# Patient Record
Sex: MALE | Race: WHITE | NOT HISPANIC OR LATINO
[De-identification: names, ages, dates, MRNs, and addresses within clinical notes are randomized per-mention and may not be internally consistent; named-entity substitution may affect disease eponyms.]

---

## 2017-07-11 ENCOUNTER — APPOINTMENT (OUTPATIENT)
Dept: SURGERY | Facility: CLINIC | Age: 67
End: 2017-07-11

## 2017-07-11 VITALS
HEART RATE: 75 BPM | DIASTOLIC BLOOD PRESSURE: 83 MMHG | HEIGHT: 69 IN | SYSTOLIC BLOOD PRESSURE: 129 MMHG | BODY MASS INDEX: 26.66 KG/M2 | WEIGHT: 180 LBS

## 2017-07-11 DIAGNOSIS — Z86.39 PERSONAL HISTORY OF OTHER ENDOCRINE, NUTRITIONAL AND METABOLIC DISEASE: ICD-10-CM

## 2017-07-11 RX ORDER — VALSARTAN 80 MG/1
80 TABLET, COATED ORAL
Qty: 90 | Refills: 0 | Status: ACTIVE | COMMUNITY
Start: 2016-09-12

## 2017-11-15 ENCOUNTER — LABORATORY RESULT (OUTPATIENT)
Age: 67
End: 2017-11-15

## 2017-11-16 ENCOUNTER — APPOINTMENT (OUTPATIENT)
Dept: SURGERY | Facility: CLINIC | Age: 67
End: 2017-11-16
Payer: MEDICARE

## 2017-11-16 VITALS — HEART RATE: 67 BPM | SYSTOLIC BLOOD PRESSURE: 153 MMHG | DIASTOLIC BLOOD PRESSURE: 88 MMHG

## 2017-11-16 PROCEDURE — 12041 INTMD RPR N-HF/GENIT 2.5CM/<: CPT

## 2017-11-16 PROCEDURE — 11423 EXC H-F-NK-SP B9+MARG 2.1-3: CPT

## 2017-11-30 ENCOUNTER — APPOINTMENT (OUTPATIENT)
Dept: SURGERY | Facility: CLINIC | Age: 67
End: 2017-11-30
Payer: MEDICARE

## 2017-11-30 DIAGNOSIS — L72.3 SEBACEOUS CYST: ICD-10-CM

## 2017-11-30 PROCEDURE — 99024 POSTOP FOLLOW-UP VISIT: CPT

## 2018-02-26 ENCOUNTER — EMERGENCY (EMERGENCY)
Facility: HOSPITAL | Age: 68
LOS: 0 days | Discharge: HOME | End: 2018-02-26
Attending: EMERGENCY MEDICINE | Admitting: EMERGENCY MEDICINE
Payer: COMMERCIAL

## 2018-02-26 VITALS
OXYGEN SATURATION: 99 % | HEART RATE: 73 BPM | SYSTOLIC BLOOD PRESSURE: 136 MMHG | TEMPERATURE: 97 F | DIASTOLIC BLOOD PRESSURE: 82 MMHG | RESPIRATION RATE: 16 BRPM

## 2018-02-26 VITALS
WEIGHT: 160.06 LBS | SYSTOLIC BLOOD PRESSURE: 150 MMHG | HEIGHT: 69 IN | HEART RATE: 68 BPM | RESPIRATION RATE: 17 BRPM | OXYGEN SATURATION: 100 % | DIASTOLIC BLOOD PRESSURE: 98 MMHG | TEMPERATURE: 97 F

## 2018-02-26 DIAGNOSIS — M25.512 PAIN IN LEFT SHOULDER: ICD-10-CM

## 2018-02-26 DIAGNOSIS — M79.1 MYALGIA: ICD-10-CM

## 2018-02-26 LAB
ALBUMIN SERPL ELPH-MCNC: 3.8 G/DL — SIGNIFICANT CHANGE UP (ref 3.3–5)
ALP SERPL-CCNC: 82 U/L — SIGNIFICANT CHANGE UP (ref 40–120)
ALT FLD-CCNC: 26 U/L — SIGNIFICANT CHANGE UP (ref 12–78)
ANION GAP SERPL CALC-SCNC: 6 MMOL/L — SIGNIFICANT CHANGE UP (ref 5–17)
AST SERPL-CCNC: 19 U/L — SIGNIFICANT CHANGE UP (ref 15–37)
BASOPHILS # BLD AUTO: 0.1 K/UL — SIGNIFICANT CHANGE UP (ref 0–0.2)
BASOPHILS NFR BLD AUTO: 1.2 % — SIGNIFICANT CHANGE UP (ref 0–2)
BILIRUB SERPL-MCNC: 0.3 MG/DL — SIGNIFICANT CHANGE UP (ref 0.2–1.2)
BUN SERPL-MCNC: 22 MG/DL — SIGNIFICANT CHANGE UP (ref 7–23)
CALCIUM SERPL-MCNC: 9.4 MG/DL — SIGNIFICANT CHANGE UP (ref 8.5–10.1)
CHLORIDE SERPL-SCNC: 106 MMOL/L — SIGNIFICANT CHANGE UP (ref 96–108)
CO2 SERPL-SCNC: 28 MMOL/L — SIGNIFICANT CHANGE UP (ref 22–31)
CREAT SERPL-MCNC: 1.16 MG/DL — SIGNIFICANT CHANGE UP (ref 0.5–1.3)
EOSINOPHIL # BLD AUTO: 0.6 K/UL — HIGH (ref 0–0.5)
EOSINOPHIL NFR BLD AUTO: 9.6 % — HIGH (ref 0–6)
GLUCOSE SERPL-MCNC: 99 MG/DL — SIGNIFICANT CHANGE UP (ref 70–99)
HCT VFR BLD CALC: 46.6 % — SIGNIFICANT CHANGE UP (ref 39–50)
HGB BLD-MCNC: 15.7 G/DL — SIGNIFICANT CHANGE UP (ref 13–17)
LYMPHOCYTES # BLD AUTO: 1.4 K/UL — SIGNIFICANT CHANGE UP (ref 1–3.3)
LYMPHOCYTES # BLD AUTO: 21.4 % — SIGNIFICANT CHANGE UP (ref 13–44)
MCHC RBC-ENTMCNC: 29.9 PG — SIGNIFICANT CHANGE UP (ref 27–34)
MCHC RBC-ENTMCNC: 33.8 GM/DL — SIGNIFICANT CHANGE UP (ref 32–36)
MCV RBC AUTO: 88.6 FL — SIGNIFICANT CHANGE UP (ref 80–100)
MONOCYTES # BLD AUTO: 0.3 K/UL — SIGNIFICANT CHANGE UP (ref 0–0.9)
MONOCYTES NFR BLD AUTO: 4.7 % — SIGNIFICANT CHANGE UP (ref 2–14)
NEUTROPHILS # BLD AUTO: 4.2 K/UL — SIGNIFICANT CHANGE UP (ref 1.8–7.4)
NEUTROPHILS NFR BLD AUTO: 63.1 % — SIGNIFICANT CHANGE UP (ref 43–77)
PLATELET # BLD AUTO: 221 K/UL — SIGNIFICANT CHANGE UP (ref 150–400)
POTASSIUM SERPL-MCNC: 4.4 MMOL/L — SIGNIFICANT CHANGE UP (ref 3.5–5.3)
POTASSIUM SERPL-SCNC: 4.4 MMOL/L — SIGNIFICANT CHANGE UP (ref 3.5–5.3)
PROT SERPL-MCNC: 7.7 GM/DL — SIGNIFICANT CHANGE UP (ref 6–8.3)
RBC # BLD: 5.26 M/UL — SIGNIFICANT CHANGE UP (ref 4.2–5.8)
RBC # FLD: 12.7 % — SIGNIFICANT CHANGE UP (ref 10.3–14.5)
SODIUM SERPL-SCNC: 140 MMOL/L — SIGNIFICANT CHANGE UP (ref 135–145)
TROPONIN I SERPL-MCNC: <0.015 NG/ML — SIGNIFICANT CHANGE UP (ref 0.01–0.04)
WBC # BLD: 6.6 K/UL — SIGNIFICANT CHANGE UP (ref 3.8–10.5)
WBC # FLD AUTO: 6.6 K/UL — SIGNIFICANT CHANGE UP (ref 3.8–10.5)

## 2018-02-26 PROCEDURE — 73030 X-RAY EXAM OF SHOULDER: CPT | Mod: 26,LT

## 2018-02-26 PROCEDURE — 99053 MED SERV 10PM-8AM 24 HR FAC: CPT

## 2018-02-26 PROCEDURE — 71250 CT THORAX DX C-: CPT | Mod: 26

## 2018-02-26 PROCEDURE — 99285 EMERGENCY DEPT VISIT HI MDM: CPT

## 2018-02-26 PROCEDURE — 93010 ELECTROCARDIOGRAM REPORT: CPT

## 2018-02-26 RX ORDER — SODIUM CHLORIDE 9 MG/ML
3 INJECTION INTRAMUSCULAR; INTRAVENOUS; SUBCUTANEOUS ONCE
Qty: 0 | Refills: 0 | Status: COMPLETED | OUTPATIENT
Start: 2018-02-26 | End: 2018-02-26

## 2018-02-26 RX ADMIN — SODIUM CHLORIDE 3 MILLILITER(S): 9 INJECTION INTRAMUSCULAR; INTRAVENOUS; SUBCUTANEOUS at 08:45
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
## 2018-02-27 RX ORDER — LOVASTATIN 20 MG
0 TABLET ORAL
Qty: 0 | Refills: 0 | COMMUNITY

## 2018-02-27 RX ORDER — VALSARTAN 80 MG/1
0 TABLET ORAL
Qty: 0 | Refills: 0 | COMMUNITY

## 2020-07-21 ENCOUNTER — EMERGENCY (EMERGENCY)
Facility: HOSPITAL | Age: 70
LOS: 0 days | Discharge: HOME | End: 2020-07-21
Payer: MEDICARE

## 2020-07-21 VITALS
DIASTOLIC BLOOD PRESSURE: 61 MMHG | HEART RATE: 74 BPM | OXYGEN SATURATION: 98 % | SYSTOLIC BLOOD PRESSURE: 122 MMHG | TEMPERATURE: 98 F

## 2020-07-21 DIAGNOSIS — Z20.828 CONTACT WITH AND (SUSPECTED) EXPOSURE TO OTHER VIRAL COMMUNICABLE DISEASES: ICD-10-CM

## 2020-07-21 PROBLEM — E78.00 PURE HYPERCHOLESTEROLEMIA, UNSPECIFIED: Chronic | Status: ACTIVE | Noted: 2018-02-26

## 2020-07-21 PROCEDURE — U0003: CPT

## 2020-07-21 PROCEDURE — 99282 EMERGENCY DEPT VISIT SF MDM: CPT

## 2020-07-21 PROCEDURE — 99283 EMERGENCY DEPT VISIT LOW MDM: CPT | Mod: CS

## 2020-07-21 PROCEDURE — 99283 EMERGENCY DEPT VISIT LOW MDM: CPT

## 2020-07-21 NOTE — ED STATDOCS - PATIENT PORTAL LINK FT
You can access the FollowMyHealth Patient Portal offered by [facility] by registering at the following website: [email or web address]. By joining [employer]’s FollowMyHealth portal, you will also be able to view your health information using other applications (apps) compatible with our system.

## 2020-07-21 NOTE — ED ADULT NURSE NOTE - OBJECTIVE STATEMENT
Patient comes to ED for COVID 19 test, pt denies any symptoms. requesting swab.  Patient evaluated, treated and D/C by PA. Refer to PA note for assessment. D/C instructions provided  Patient provides verbal consent to receive results via text or email.

## 2020-07-21 NOTE — ED STATDOCS - OBJECTIVE STATEMENT
Pt presents to ED with no fever, no cough, no  runny nose, no body aches, no sore throat.. Pt recently exposed to COVID-19. Pt here for testing.

## 2020-07-22 LAB — SARS-COV-2 RNA SPEC QL NAA+PROBE: SIGNIFICANT CHANGE UP

## 2020-08-07 ENCOUNTER — EMERGENCY (EMERGENCY)
Facility: HOSPITAL | Age: 70
LOS: 0 days | Discharge: HOME | End: 2020-08-07
Payer: MEDICARE

## 2020-08-07 VITALS
TEMPERATURE: 99 F | HEART RATE: 69 BPM | SYSTOLIC BLOOD PRESSURE: 120 MMHG | OXYGEN SATURATION: 98 % | RESPIRATION RATE: 17 BRPM | DIASTOLIC BLOOD PRESSURE: 70 MMHG

## 2020-08-07 DIAGNOSIS — Z11.59 ENCOUNTER FOR SCREENING FOR OTHER VIRAL DISEASES: ICD-10-CM

## 2020-08-07 DIAGNOSIS — Z03.818 ENCOUNTER FOR OBSERVATION FOR SUSPECTED EXPOSURE TO OTHER BIOLOGICAL AGENTS RULED OUT: ICD-10-CM

## 2020-08-07 PROCEDURE — 99282 EMERGENCY DEPT VISIT SF MDM: CPT

## 2020-08-07 PROCEDURE — 99283 EMERGENCY DEPT VISIT LOW MDM: CPT

## 2020-08-07 PROCEDURE — U0003: CPT

## 2020-08-07 PROCEDURE — 99283 EMERGENCY DEPT VISIT LOW MDM: CPT | Mod: CS

## 2020-08-07 NOTE — ED STATDOCS - PATIENT PORTAL LINK FT
You can access the FollowMyHealth Patient Portal offered by [facility] by registering at the following website: [email or web address]. By joining [email or web address]’s FollowMyHealth portal, you will also be able to view your health information using other applications (apps) compatible with our system.

## 2020-08-08 LAB — SARS-COV-2 RNA SPEC QL NAA+PROBE: SIGNIFICANT CHANGE UP

## 2022-01-18 NOTE — ED ADULT TRIAGE NOTE - NS ED NURSE BANDS TYPE
Date of Injury:  12/21/2021     First Date Seen:  12/21/21    Subjective:  Patient presents with 7 on a 10 pain scale today.  Patient reports a level of low back pain similar to previous visit.  He notes that his ability to rotate his low back moved with less pain now.  He still has pain when he lifts things off the ground or squat.  Continues to have persistent right knee pain and he is referred to an orthopedic provider for assessment.    Objective: Taut and tender fibers are noted in the right knee, T4, T5, T6, T7, T12, L4, L5, sacroiliac left and sacroiliac right regions.  Bilateral knee flexion test is negative.  Hip internal and external rotation are within normal limits and negative  for increasing pain.    Opinion:Diagnosis of right knee pain, thoracic sprain , lumbar sprain and sacroiliac sprain. Patient progressing as expected.    Options: Patient's options other than care in this office include home based advice to remain active and against bed rest. Patient is advised to perform Ewa extension exercises home based exercise.    Advice: Patient is advised to continue with home program.    Agreed Plan: Patient will follow up on one week.    Procedures:  The patient understands and agrees to the following procedures:  Chiropractic manipulative treatment is performed at right knee, T4, T5, T6, T7, T12, L4, L5, sacroiliac left and sacroiliac right.  Vibratory massage performed in the trapezius, lumbar region for a period of 5 minutes.  Patient performs Ewa extension exercises home based exercise. Total instruction time 8 minutes.   Post-manipulation audit reveals an increase in range of motion and decrease in pain.  End of visit pain rated 2.       Name band;

## 2023-02-02 ENCOUNTER — APPOINTMENT (OUTPATIENT)
Dept: ORTHOPEDIC SURGERY | Facility: CLINIC | Age: 73
End: 2023-02-02
Payer: MEDICARE

## 2023-02-02 VITALS — HEIGHT: 69 IN | BODY MASS INDEX: 27.4 KG/M2 | WEIGHT: 185 LBS

## 2023-02-02 PROCEDURE — 73562 X-RAY EXAM OF KNEE 3: CPT | Mod: LT

## 2023-02-02 PROCEDURE — 20610 DRAIN/INJ JOINT/BURSA W/O US: CPT | Mod: LT

## 2023-02-02 PROCEDURE — 99214 OFFICE O/P EST MOD 30 MIN: CPT | Mod: 25

## 2023-02-02 NOTE — HISTORY OF PRESENT ILLNESS
[8] : 8 [0] : 0 [Sharp] : sharp [Stabbing] : stabbing [Intermittent] : intermittent [Exercising] : exercising [Lying in bed] : lying in bed [Part time] : Work status: part time [de-identified] : 02/02/23:  Return visit for a 72 year old male here for complaint of recurrent left knee pain and known OA that began over 1 year ago. Taking no nsaids. Worse doing Nat classes or running, or turning and changing directions. Described as sharp fleeting pain. Can walk 7 miles w/o pain.\par \par PMH:  Status post arthroscopic right shoulder surgery two months ago.\par \par IMPRESSION: RIGHT KNEE 04/23/19\par 1. Popliteus tendinosis.\par 2. Radial tears of medial and lateral menisci; further detailed above.\par 3. Arthropathy preferential to medial compartment, in particular outer one-third medial tibial plateau\par with associated subchondral remodeling.\par 4. Joint effusion with joint fluid in popliteus tendon sheath and synovitis in popliteus tendon sheath.\par ___________________________________\par 04/18/19: Initial visit for a 68 year old male who states he has been having difficulty exercising for the last year because of developing bilateral knee pain, right worse than left. He has now cut down some on his routine but symptoms are persisting and worsening in the past six days. Used topical cream for pain and Advil once or twice a week for pain. Used kinseology tape which has given him about 90% relief. No prior knee pain or surgery [] : no [FreeTextEntry1] : left knee  [de-identified] : none [FreeTextEntry9] : no treatments [de-identified] : elijah

## 2023-02-02 NOTE — DISCUSSION/SUMMARY
[de-identified] : "Written by Alyce Mixon, acting as Scribe for Ike Mar MD."\par \par Dr. Mar - \par The documentation recorded by the scribe accurately reflects the service I personally performed and the decisions made by me.

## 2023-02-02 NOTE — PROCEDURE
[Large Joint Injection] : Large joint injection [Left] : of the left [Knee] : knee [Pain] : pain [X-ray evidence of Osteoarthritis on this or prior visit] : x-ray evidence of Osteoarthritis on this or prior visit [Betadine] : betadine [Ethyl Chloride sprayed topically] : ethyl chloride sprayed topically [Euflexxa(20mg)] : 20mg of Euflexxa [#1] : series #1 [] : Patient tolerated procedure well [Call if redness, pain or fever occur] : call if redness, pain or fever occur [Apply ice for 15min out of every hour for the next 12-24 hours as tolerated] : apply ice for 15 minutes out of every hour for the next 12-24 hours as tolerated [Risks, benefits, alternatives discussed / Verbal consent obtained] : the risks benefits, and alternatives have been discussed, and verbal consent was obtained

## 2023-02-02 NOTE — PHYSICAL EXAM
[Normal Mood and Affect] : normal mood and affect [Able to Communicate] : able to communicate [Well Developed] : well developed [Well Nourished] : well nourished [Right] : right knee [NL (0)] : extension 0 degrees [5___] : hamstring 5[unfilled]/5 [NL (140)] : flexion 140 degrees [Left] : left knee [AP] : anteroposterior [Lateral] : lateral [[place]] : skyline [Degenerative change] : Degenerative change [] : non-antalgic [FreeTextEntry9] : Moderate-severe narrowing medial.  Tiny spurs patella. [TWNoteComboBox7] : flexion 135 degrees

## 2023-02-09 ENCOUNTER — APPOINTMENT (OUTPATIENT)
Dept: ORTHOPEDIC SURGERY | Facility: CLINIC | Age: 73
End: 2023-02-09
Payer: MEDICARE

## 2023-02-09 PROCEDURE — 20610 DRAIN/INJ JOINT/BURSA W/O US: CPT | Mod: LT

## 2023-02-09 PROCEDURE — 99213 OFFICE O/P EST LOW 20 MIN: CPT | Mod: 25

## 2023-02-09 NOTE — DISCUSSION/SUMMARY
[de-identified] : "Written by Alyce Mixon, acting as Scribe for Ike Mar MD."\par \par Dr. Mar - \par The documentation recorded by the scribe accurately reflects the service I personally performed and the decisions made by me.

## 2023-02-09 NOTE — PROCEDURE
[Large Joint Injection] : Large joint injection [Left] : of the left [Knee] : knee [Pain] : pain [X-ray evidence of Osteoarthritis on this or prior visit] : x-ray evidence of Osteoarthritis on this or prior visit [Betadine] : betadine [Ethyl Chloride sprayed topically] : ethyl chloride sprayed topically [Euflexxa(20mg)] : 20mg of Euflexxa [#2] : series #2 [] : Patient tolerated procedure well [Call if redness, pain or fever occur] : call if redness, pain or fever occur [Apply ice for 15min out of every hour for the next 12-24 hours as tolerated] : apply ice for 15 minutes out of every hour for the next 12-24 hours as tolerated [Risks, benefits, alternatives discussed / Verbal consent obtained] : the risks benefits, and alternatives have been discussed, and verbal consent was obtained

## 2023-02-09 NOTE — HISTORY OF PRESENT ILLNESS
[de-identified] : 02/09/23:  Here today for Euflexxa injection #2, left knee.\par \par 02/02/23:  Return visit for a 72 year old male here for complaint of recurrent left knee pain and known OA that began over 1 year ago. Taking no nsaids. Worse doing Nat classes or running, or turning and changing directions. Described as sharp fleeting pain. Can walk 7 miles w/o pain.\par \par PMH:  Status post arthroscopic right shoulder surgery two months ago.\par \par IMPRESSION: RIGHT KNEE 04/23/19\par 1. Popliteus tendinosis.\par 2. Radial tears of medial and lateral menisci; further detailed above.\par 3. Arthropathy preferential to medial compartment, in particular outer one-third medial tibial plateau\par with associated subchondral remodeling.\par 4. Joint effusion with joint fluid in popliteus tendon sheath and synovitis in popliteus tendon sheath.\par ___________________________________\par 04/18/19: Initial visit for a 68 year old male who states he has been having difficulty exercising for the last year because of developing bilateral knee pain, right worse than left. He has now cut down some on his routine but symptoms are persisting and worsening in the past six days. Used topical cream for pain and Advil once or twice a week for pain. Used kinseology tape which has given him about 90% relief. No prior knee pain or surgery

## 2023-02-09 NOTE — PHYSICAL EXAM
[Normal Mood and Affect] : normal mood and affect [Able to Communicate] : able to communicate [Well Developed] : well developed [Well Nourished] : well nourished [Right] : right knee [NL (140)] : flexion 140 degrees [NL (0)] : extension 0 degrees [5___] : hamstring 5[unfilled]/5 [Left] : left knee [AP] : anteroposterior [Lateral] : lateral [[place]] : skyline [Degenerative change] : Degenerative change [] : non-antalgic [FreeTextEntry9] : Moderate-severe narrowing medial.  Tiny spurs patella. [TWNoteComboBox7] : flexion 135 degrees

## 2023-02-16 ENCOUNTER — APPOINTMENT (OUTPATIENT)
Dept: ORTHOPEDIC SURGERY | Facility: CLINIC | Age: 73
End: 2023-02-16
Payer: MEDICARE

## 2023-02-16 VITALS — WEIGHT: 185 LBS | BODY MASS INDEX: 27.4 KG/M2 | HEIGHT: 69 IN

## 2023-02-16 PROCEDURE — 99213 OFFICE O/P EST LOW 20 MIN: CPT | Mod: 25

## 2023-02-16 PROCEDURE — 20610 DRAIN/INJ JOINT/BURSA W/O US: CPT

## 2023-02-16 NOTE — HISTORY OF PRESENT ILLNESS
[1] : 2 [0] : 0 [3] : 3 [Euflexxa] : Euflexxa [Stabbing] : stabbing [Intermittent] : intermittent [Nothing helps with pain getting better] : Nothing helps with pain getting better [Walking] : walking [de-identified] : 02/16/23:  Returns today for Eulfexxa injection #3, left knee. \par \par 02/09/23:  Here today for Euflexxa injection #2, left knee.\par \par 02/02/23:  Return visit for a 72 year old male here for complaint of recurrent left knee pain and known OA that began over 1 year ago. Taking no nsaids. Worse doing Nat classes or running, or turning and changing directions. Described as sharp fleeting pain. Can walk 7 miles w/o pain.\par \par PMH:  Status post arthroscopic right shoulder surgery two months ago.\par \par IMPRESSION: RIGHT KNEE 04/23/19\par 1. Popliteus tendinosis.\par 2. Radial tears of medial and lateral menisci; further detailed above.\par 3. Arthropathy preferential to medial compartment, in particular outer one-third medial tibial plateau\par with associated subchondral remodeling.\par 4. Joint effusion with joint fluid in popliteus tendon sheath and synovitis in popliteus tendon sheath.\par ___________________________________\par 04/18/19: Initial visit for a 68 year old male who states he has been having difficulty exercising for the last year because of developing bilateral knee pain, right worse than left. He has now cut down some on his routine but symptoms are persisting and worsening in the past six days. Used topical cream for pain and Advil once or twice a week for pain. Used kinseology tape which has given him about 90% relief. No prior knee pain or surgery [] : no [FreeTextEntry1] : left knee [de-identified] : certain movements [de-identified] : 2/923 [de-identified] : left knee [de-identified] : Euflexxa

## 2023-02-16 NOTE — PHYSICAL EXAM
[Normal Mood and Affect] : normal mood and affect [Able to Communicate] : able to communicate [Well Developed] : well developed [Well Nourished] : well nourished [Right] : right knee [NL (140)] : flexion 140 degrees [NL (0)] : extension 0 degrees [5___] : hamstring 5[unfilled]/5 [Left] : left knee [AP] : anteroposterior [Lateral] : lateral [[place]] : skyline [Degenerative change] : Degenerative change [] : non-antalgic [FreeTextEntry9] : Moderate-severe narrowing medial.  Tiny spurs patella. [TWNoteComboBox7] : flexion 135 degrees

## 2023-02-16 NOTE — PROCEDURE
[Large Joint Injection] : Large joint injection [Left] : of the left [Knee] : knee [Pain] : pain [X-ray evidence of Osteoarthritis on this or prior visit] : x-ray evidence of Osteoarthritis on this or prior visit [Betadine] : betadine [Ethyl Chloride sprayed topically] : ethyl chloride sprayed topically [Euflexxa(20mg)] : 20mg of Euflexxa [#3] : series #3 [] : Patient tolerated procedure well [Call if redness, pain or fever occur] : call if redness, pain or fever occur [Apply ice for 15min out of every hour for the next 12-24 hours as tolerated] : apply ice for 15 minutes out of every hour for the next 12-24 hours as tolerated [Risks, benefits, alternatives discussed / Verbal consent obtained] : the risks benefits, and alternatives have been discussed, and verbal consent was obtained

## 2023-09-12 ENCOUNTER — RESULT CHARGE (OUTPATIENT)
Age: 73
End: 2023-09-12

## 2023-09-13 ENCOUNTER — APPOINTMENT (OUTPATIENT)
Dept: CARDIOLOGY | Facility: CLINIC | Age: 73
End: 2023-09-13
Payer: MEDICARE

## 2023-09-13 VITALS
HEIGHT: 69 IN | HEART RATE: 69 BPM | SYSTOLIC BLOOD PRESSURE: 132 MMHG | DIASTOLIC BLOOD PRESSURE: 80 MMHG | OXYGEN SATURATION: 96 % | WEIGHT: 188 LBS | BODY MASS INDEX: 27.85 KG/M2

## 2023-09-13 VITALS — DIASTOLIC BLOOD PRESSURE: 78 MMHG | SYSTOLIC BLOOD PRESSURE: 128 MMHG

## 2023-09-13 DIAGNOSIS — Z87.898 PERSONAL HISTORY OF OTHER SPECIFIED CONDITIONS: ICD-10-CM

## 2023-09-13 DIAGNOSIS — N28.89 OTHER SPECIFIED DISORDERS OF KIDNEY AND URETER: ICD-10-CM

## 2023-09-13 DIAGNOSIS — Z82.49 FAMILY HISTORY OF ISCHEMIC HEART DISEASE AND OTHER DISEASES OF THE CIRCULATORY SYSTEM: ICD-10-CM

## 2023-09-13 DIAGNOSIS — Z78.9 OTHER SPECIFIED HEALTH STATUS: ICD-10-CM

## 2023-09-13 PROCEDURE — 99204 OFFICE O/P NEW MOD 45 MIN: CPT | Mod: 25

## 2023-09-13 PROCEDURE — 93000 ELECTROCARDIOGRAM COMPLETE: CPT

## 2023-09-13 PROCEDURE — 99214 OFFICE O/P EST MOD 30 MIN: CPT | Mod: 25

## 2023-09-13 RX ORDER — BENZOCAINE/BENZALKONIUM/ALOE/E 5 %-0.13 %
10 CREAM (GRAM) TOPICAL
Refills: 0 | Status: ACTIVE | COMMUNITY

## 2023-09-13 RX ORDER — MV-MIN/FOLIC/K1/LYCOPEN/LUTEIN 300-60 MCG
TABLET ORAL
Refills: 0 | Status: ACTIVE | COMMUNITY

## 2023-09-13 RX ORDER — OLMESARTAN MEDOXOMIL 40 MG/1
TABLET, FILM COATED ORAL
Refills: 0 | Status: ACTIVE | COMMUNITY

## 2023-09-22 ENCOUNTER — APPOINTMENT (OUTPATIENT)
Dept: ORTHOPEDIC SURGERY | Facility: CLINIC | Age: 73
End: 2023-09-22

## 2023-09-25 ENCOUNTER — APPOINTMENT (OUTPATIENT)
Dept: ORTHOPEDIC SURGERY | Facility: CLINIC | Age: 73
End: 2023-09-25
Payer: MEDICARE

## 2023-09-25 VITALS — HEIGHT: 69 IN | BODY MASS INDEX: 27.85 KG/M2 | WEIGHT: 188 LBS

## 2023-09-25 PROCEDURE — 99213 OFFICE O/P EST LOW 20 MIN: CPT | Mod: 25

## 2023-09-25 PROCEDURE — 20610 DRAIN/INJ JOINT/BURSA W/O US: CPT | Mod: LT

## 2023-10-02 ENCOUNTER — APPOINTMENT (OUTPATIENT)
Dept: ORTHOPEDIC SURGERY | Facility: CLINIC | Age: 73
End: 2023-10-02

## 2023-10-02 PROBLEM — M17.12 ARTHRITIS OF KNEE, LEFT: Status: ACTIVE | Noted: 2023-10-02

## 2023-10-03 ENCOUNTER — APPOINTMENT (OUTPATIENT)
Dept: ORTHOPEDIC SURGERY | Facility: CLINIC | Age: 73
End: 2023-10-03
Payer: MEDICARE

## 2023-10-03 DIAGNOSIS — M17.12 UNILATERAL PRIMARY OSTEOARTHRITIS, LEFT KNEE: ICD-10-CM

## 2023-10-03 PROCEDURE — 20610 DRAIN/INJ JOINT/BURSA W/O US: CPT | Mod: LT

## 2023-10-03 PROCEDURE — 99214 OFFICE O/P EST MOD 30 MIN: CPT | Mod: 25

## 2023-10-06 ENCOUNTER — TRANSCRIPTION ENCOUNTER (OUTPATIENT)
Age: 73
End: 2023-10-06

## 2023-10-12 ENCOUNTER — APPOINTMENT (OUTPATIENT)
Dept: ORTHOPEDIC SURGERY | Facility: CLINIC | Age: 73
End: 2023-10-12
Payer: MEDICARE

## 2023-10-12 VITALS — BODY MASS INDEX: 27.85 KG/M2 | HEIGHT: 69 IN | WEIGHT: 188 LBS

## 2023-10-12 PROCEDURE — 99213 OFFICE O/P EST LOW 20 MIN: CPT | Mod: 25

## 2023-10-12 PROCEDURE — 20610 DRAIN/INJ JOINT/BURSA W/O US: CPT | Mod: LT

## 2023-12-13 ENCOUNTER — APPOINTMENT (OUTPATIENT)
Dept: CARDIOLOGY | Facility: CLINIC | Age: 73
End: 2023-12-13
Payer: MEDICARE

## 2023-12-13 VITALS
OXYGEN SATURATION: 97 % | SYSTOLIC BLOOD PRESSURE: 128 MMHG | BODY MASS INDEX: 27.76 KG/M2 | WEIGHT: 188 LBS | HEART RATE: 73 BPM | DIASTOLIC BLOOD PRESSURE: 80 MMHG

## 2023-12-13 PROCEDURE — 93015 CV STRESS TEST SUPVJ I&R: CPT

## 2023-12-13 PROCEDURE — 99213 OFFICE O/P EST LOW 20 MIN: CPT

## 2023-12-13 NOTE — REASON FOR VISIT
[Symptom and Test Evaluation] : symptom and test evaluation [Hyperlipidemia] : hyperlipidemia [Hypertension] : hypertension [Spouse] : spouse [FreeTextEntry1] : Patient is a 73-year-old male who presents to the office today to review stress test which was recommended at time of his last visit given his age, cardiovascular risk factors including hypertension hyperlipidemia.

## 2023-12-13 NOTE — CARDIOLOGY SUMMARY
[de-identified] : Conclusions: 1. Stress electrocardiogram: No significant ischemic ST segment changes beyond baseline abnormalities. 2. No evidence of exercise induced ischemia by EKG 3. Rare PVC's  ---------------------------------------------------------------------------------------------------------------------------------------------------------  Stress Test Results: METS Achieved: 10 Stage Reached: 4 Exercise Duration: 9 min and 26 sec. Heart Rate: Resting 76 bpm, Stress peak 128 bpm (88 % max predicted) HR Response: Normal. Blood Pressure: Resting 128/60 mmHg, Stress max 154/88 mmHg BP Response: Normal. Exercise Capacity: Good. Pretest Chest Pain: None. Stress Test Chest Pain: No chest pain Symptoms During Stress: No symptoms. Reason for Termination: Target heart rate achieved.    conclusions: 1. Stress electrocardiogram: No significant ischemic ST segment changes beyond baseline abnormalities. 2. No evidence of exercise induced ischemia by EKG 3. Rare PVC's  ---------------------------------------------------------------------------------------------------------------------------------------------------------  Stress Test Results: METS Achieved: 10 Stage Reached: 4 Exercise Duration: 9 min and 26 sec. Heart Rate: Resting 76 bpm, Stress peak 128 bpm (88 % max predicted) HR Response: Normal. Blood Pressure: Resting 128/60 mmHg, Stress max 154/88 mmHg BP Response: Normal. Exercise Capacity: Good. Pretest Chest Pain: None. Stress Test Chest Pain: No chest pain Symptoms During Stress: No symptoms. Reason for Termination: Target heart rate achieved.

## 2023-12-13 NOTE — REVIEW OF SYSTEMS
[Joint Stiffness] : joint stiffness [Negative] : Psychiatric [SOB] : no shortness of breath [Dyspnea on exertion] : not dyspnea during exertion [Chest Discomfort] : no chest discomfort [Lower Ext Edema] : no extremity edema [Leg Claudication] : no intermittent leg claudication [Palpitations] : no palpitations [Orthopnea] : no orthopnea [PND] : no PND [Syncope] : no syncope [FreeTextEntry9] : Chronic knee pain has received multiple injections.  Recent shoulder surgery totally successful

## 2023-12-13 NOTE — DISCUSSION/SUMMARY
[FreeTextEntry1] : Patient is a very pleasant 72-year-old white male with hypertension and hyperlipidemia who presents today for stress testing which revealed good exercise tolerance, no EKG changes suspicious for ischemia, no symptoms and no significant arrhythmias.  He had a mildly hypertensive response on medication.  He was informed that there was no evidence of active ischemia.  His blood pressure medicines need to be continued and otherwise no changes were made to his medical regimen today.  Patient was recommended to return to the office in approximately 4 months time for interval follow-up.  Joel Goldberg, MD, FACC   \

## 2023-12-13 NOTE — HISTORY OF PRESENT ILLNESS
[FreeTextEntry1] : Patient is a 72-year-old white male well-known to me who presents today to review a plain stress test recommended at the time of his last visit on September 13, 2023.  Patient has been well-known to me   for several years initially for what was considered to be whitecoat hypertension and ongoing treatment of his hyperlipidemia with documented atherosclerotic disease based on carotid duplex scanning.  Patient presents today noting that he has been feeling well.  He has some orthopedic knee issues and is limited to his aerobic activity.  He is to be involved in [employer] classes currently he is golfing and walking up to 3 miles without any cardiovascular compromise.  He denies chest pain, palpitations, dizziness or lightheadedness, no exertional discomfort.  He is taking and tolerating his current medication which includes valsartan 80 he continues to take without any issues.  Patient notes that his blood pressure was originally whitecoat hypertension but later more consistent elevations and exaggerated blood pressure response exercise prompted initiation of therapy.  He feels well since on therapy and has had appropriate heart rate and blood pressure responses to subsequent stress testing.

## 2024-03-25 ENCOUNTER — RESULT CHARGE (OUTPATIENT)
Age: 74
End: 2024-03-25

## 2024-03-25 ENCOUNTER — APPOINTMENT (OUTPATIENT)
Dept: ORTHOPEDIC SURGERY | Facility: CLINIC | Age: 74
End: 2024-03-25
Payer: MEDICARE

## 2024-03-25 VITALS — HEIGHT: 69 IN | BODY MASS INDEX: 27.4 KG/M2 | WEIGHT: 185 LBS

## 2024-03-25 DIAGNOSIS — M47.816 SPONDYLOSIS W/OUT MYELOPATHY OR RADICULOPATHY, LUMBAR REGION: ICD-10-CM

## 2024-03-25 DIAGNOSIS — M51.36 OTHER INTERVERTEBRAL DISC DEGENERATION, LUMBAR REGION: ICD-10-CM

## 2024-03-25 PROCEDURE — 72170 X-RAY EXAM OF PELVIS: CPT

## 2024-03-25 PROCEDURE — 72100 X-RAY EXAM L-S SPINE 2/3 VWS: CPT

## 2024-03-25 PROCEDURE — 99214 OFFICE O/P EST MOD 30 MIN: CPT

## 2024-03-25 NOTE — HISTORY OF PRESENT ILLNESS
"09/04/18  1:46 PM  Veronica Henao  1937    Home Phone [phone]       Veronica Henao reports chest pain that has been occurring since 8/30- 9/2. Pt states that she was seen in the hospital for shortness of breath, admitted initially under Dr. Ferrer. Pt reports that when she arrived home and started taking her newly discharged medication Nifedipine 60 mg, she instantly felt \"like someone was punching her in the chest\" she states that it would radiate up into her lungs, through to her back, into her shoulders, neck and jaw. Pt reports that this would last for approximately 30 mins continually. It was eased with rest. It was accompanied with nausea, lightheadedness, dizziness and extreme fatigue. Pt states that once the episode is over, she has a residual sore feeling.   Pt denies any increased edema, Pt denies tach or palp since being at home. Pt denies shortness of breath/chest pain outside of these events, these events only occur after taking this medication.   Pt reports that she stopped taking the Nifedipine on Sunday. She reports that she has not had any events like this since that time, reports some very mild soreness today.    Reports BP yesterday 159/70, 72.    This Pt is followed by , she is due to see you 9/10, and  on 9/27.  Cardiac history of A-fib, a-flutter,  hypertension, valve disease, hyperlipidemia, sleep apnea, renal insuffiencey.    Nifedipine CC 60 mg QD  Furosemide 40 mg QD  Ranitidine 150 mg BID  Xarelto 15 mg QD  Hydralazine 100 mg BID    Do you want the Pt to be seen in [facility]? Do you want her to be seen in office,  has a new Patient spot tomorrow that I can break apart if needed. Do you want her to return to the hospital?    [phone]           " [3] : 3 [Constant] : constant [Lying in bed] : lying in bed [Euflexxa] : Euflexxa [Lower back] : lower back [Sudden] : sudden [4] : 4 [Tightness] : tightness [Physical therapy] : physical therapy [Full time] : Work status: full time [de-identified] : 03/25/24:  Returns today c/o acute onset of LBP after bending and lifting packages x 1 month ago. Took some advil prn and started PT2x/week which helps a little. Worse turning in bed. No wake up pain . LBP is a "2-3". Localized to LB only.  PMH: Hx of occ LBP in the past  10/12/23:  Returns today for Euflexxa injection #3, left knee.  Injection #2 given one week ago by Dr. Abarca.  09/25/23:  Returns today complaining of recurrent left knee pain.  Finished Euflexxa injections over 7 months ago. Would like to repeat the injections.  02/16/23:  Returns today for Euflexxa injection #3, left knee.   02/09/23:  Here today for Euflexxa injection #2, left knee.  02/02/23:  Return visit for a 72 year old male here for complaint of recurrent left knee pain and known OA that began over 1 year ago. Taking no nsaids. Worse doing Nat classes or running, or turning and changing directions. Described as sharp fleeting pain. Can walk 7 miles w/o pain.  PMH:  Status post arthroscopic right shoulder surgery two months ago.  IMPRESSION: RIGHT KNEE 04/23/19 1. Popliteus tendinosis. 2. Radial tears of medial and lateral menisci; further detailed above. 3. Arthropathy preferential to medial compartment, in particular outer one-third medial tibial plateau with associated subchondral remodeling. 4. Joint effusion with joint fluid in popliteus tendon sheath and synovitis in popliteus tendon sheath. ___________________________________ 04/18/19: Initial visit for a 68 year old male who states he has been having difficulty exercising for the last year because of developing bilateral knee pain, right worse than left. He has now cut down some on his routine but symptoms are persisting and worsening in the past six days. Used topical cream for pain and Advil once or twice a week for pain. Used kinseology tape which has given him about 90% relief. 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

## 2024-03-25 NOTE — PHYSICAL EXAM
[Normal Mood and Affect] : normal mood and affect [Able to Communicate] : able to communicate [Well Developed] : well developed [Well Nourished] : well nourished [Right] : right knee [NL (0)] : extension 0 degrees [NL (140)] : flexion 140 degrees [5___] : hamstring 5[unfilled]/5 [Left] : left knee [Lateral] : lateral [AP] : anteroposterior [[place]] : skyline [Degenerative change] : Degenerative change [NL (90)] : forward flexion 90 degrees [NL (30)] : right lateral bending 30 degrees [Extension] : extension [Disc space narrowing] : Disc space narrowing [de-identified] : extension 20 degrees [FreeTextEntry1] : slight degen scoliosis. Multilevel DDD from L1-S1   [] : non-antalgic [FreeTextEntry9] : Moderate-severe narrowing medial.  Tiny spurs patella. [TWNoteComboBox7] : flexion 135 degrees

## 2024-03-25 NOTE — PLAN
[TextEntry] : We discussed at length with the patient the options for treatment.  We discussed conservative care including physical therapy, acupuncture, massage therapy and chiropractic care.  We discussed injection therapy and even surgical intervention should the patient fail conservative care.  We discussed, risks, benefits, complications, alternatives, outcomes and expectations.   All questions answered.  Medrol dose pack was prescribed. Risks and benefits were explained including but not limited to the possibilities of gastritis, indigestion, and other GI side effects. It was also explained that in patients with a history of diabetes mellitus, it may temporarily elevate their blood sugars which should be monitored at home. A refill was also prescribed to take the subsequent week if needed.  Patient was advised to continue with physical therapy.  If no improvement, consider MRI.   .

## 2024-04-05 ENCOUNTER — NON-APPOINTMENT (OUTPATIENT)
Age: 74
End: 2024-04-05

## 2024-04-05 ENCOUNTER — APPOINTMENT (OUTPATIENT)
Dept: CARDIOLOGY | Facility: CLINIC | Age: 74
End: 2024-04-05
Payer: MEDICARE

## 2024-04-05 VITALS
BODY MASS INDEX: 27.76 KG/M2 | DIASTOLIC BLOOD PRESSURE: 62 MMHG | OXYGEN SATURATION: 95 % | WEIGHT: 188 LBS | HEART RATE: 91 BPM | SYSTOLIC BLOOD PRESSURE: 112 MMHG

## 2024-04-05 DIAGNOSIS — E78.49 OTHER HYPERLIPIDEMIA: ICD-10-CM

## 2024-04-05 DIAGNOSIS — M17.12 UNILATERAL PRIMARY OSTEOARTHRITIS, LEFT KNEE: ICD-10-CM

## 2024-04-05 DIAGNOSIS — I10 ESSENTIAL (PRIMARY) HYPERTENSION: ICD-10-CM

## 2024-04-05 PROCEDURE — 93000 ELECTROCARDIOGRAM COMPLETE: CPT

## 2024-04-05 PROCEDURE — 99214 OFFICE O/P EST MOD 30 MIN: CPT

## 2024-04-05 RX ORDER — METHYLPREDNISOLONE 4 MG/1
4 TABLET ORAL
Qty: 1 | Refills: 1 | Status: DISCONTINUED | COMMUNITY
Start: 2024-03-25 | End: 2024-04-05

## 2024-04-05 RX ORDER — PITAVASTATIN CALCIUM 4 MG/1
4 TABLET ORAL
Qty: 90 | Refills: 1 | Status: ACTIVE | COMMUNITY
Start: 2016-09-12 | End: 1900-01-01

## 2024-04-05 NOTE — CARDIOLOGY SUMMARY
[de-identified] : (4/5/2024) EKG NSR at 89 beats minute with a frontal QRS axis of 0 degrees otherwise normal tracing [de-identified] : (12/13/2023) STRESS TEST CONCLUSIONS:  1. Stress electrocardiogram: No significant ischemic ST segment changes beyond baseline abnormalities. 2. No evidence of exercise induced ischemia by EKG 3. Rare PVC's  ---------------------------------------------------------------------------------------------------------------------------------------------------------  Stress Test Results: METS Achieved: 10 Stage Reached: 4 Exercise Duration: 9 min and 26 sec. Heart Rate: Resting 76 bpm, Stress peak 128 bpm (88 % max predicted) HR Response: Normal. Blood Pressure: Resting 128/60 mmHg, Stress max 154/88 mmHg BP Response: Normal. Exercise Capacity: Good. Pretest Chest Pain: None. Stress Test Chest Pain: No chest pain Symptoms During Stress: No symptoms. Reason for Termination: Target heart rate achieved.

## 2024-04-05 NOTE — PHYSICAL EXAM
[Well Developed] : well developed [Well Nourished] : well nourished [No Acute Distress] : no acute distress [Normal Venous Pressure] : normal venous pressure [No Carotid Bruit] : no carotid bruit [Normal S1, S2] : normal S1, S2 [No Murmur] : no murmur [No Rub] : no rub [Normal] : clear lung fields, good air entry, no respiratory distress [Clear Lung Fields] : clear lung fields [No Respiratory Distress] : no respiratory distress  [Soft] : abdomen soft [Non Tender] : non-tender [Normal Gait] : normal gait [No Edema] : no edema [No Cyanosis] : no cyanosis [No Clubbing] : no clubbing [No Focal Deficits] : no focal deficits

## 2024-04-05 NOTE — REVIEW OF SYSTEMS
[Weight Gain (___ Lbs)] : [unfilled] ~Ulb weight gain [Joint Stiffness] : joint stiffness [Negative] : Psychiatric [SOB] : no shortness of breath [Dyspnea on exertion] : not dyspnea during exertion [Chest Discomfort] : no chest discomfort [Lower Ext Edema] : no extremity edema [Leg Claudication] : no intermittent leg claudication [Palpitations] : no palpitations [Orthopnea] : no orthopnea [PND] : no PND [Syncope] : no syncope [FreeTextEntry9] : Chronic knee pain has received multiple injections.  Recent shoulder surgery totally successful

## 2024-04-05 NOTE — REASON FOR VISIT
Present, accurate, and signed [CV Risk Factors and Non-Cardiac Disease] : CV risk factors and non-cardiac disease [Hyperlipidemia] : hyperlipidemia [Hypertension] : hypertension [Other: ____] : [unfilled] [FreeTextEntry1] : Patient is a 73-year-old white male who presents the office today for routine interval follow-up regarding concerns over his hypertension, hyperlipidemia and now quite distressed over recent weight gain resulting from drop in exercise related to chronic knee issues.

## 2024-04-05 NOTE — DISCUSSION/SUMMARY
[EKG obtained to assist in diagnosis and management of assessed problem(s)] : EKG obtained to assist in diagnosis and management of assessed problem(s) [FreeTextEntry1] : Patient is a very pleasant 73-year-old white male with hypertension and hyperlipidemia who presents today concerned about recent weight gain and the effect it may have on his other cardiovascular risk factors.  Patient is taking and tolerating his antihypertensive regimen and statin therapy well.  Blood pressure is acceptable however he is less active because of recent arthritic knee issues accounting for his recent weight gain.  Patient is quite interested in initiation of injectable weight loss medication and extensive discussion occurred regarding its use, efficacy and indication.  Patient is taking and tolerating his medication without issue. He was reassured regarding his cardiovascular status and advised to continue current meds. A full set of laboratory data was obtained and he will be called when results come available.  A prescription for Zepbound was written and he was instructed on its use and he will follow-up with blood tests in 6 weeks and otherwise for an interval follow-up in 4 months.  He was advised to continue therapy on his knees that he can regain more strenuous activity on a regular basis.  Patient otherwise recommended return to the office in 4 months at which time updated laboratory data will be obtained  Joel Goldberg, MD, FACC

## 2024-04-05 NOTE — HISTORY OF PRESENT ILLNESS
[FreeTextEntry1] : Patient is a 73 year-old white male well-known to me who presents today for routine interval follow-up.  He presents today quite concerned over his recent weight gain.  He was last here in December 2023 to review a plain stress test recommended at the time of his last visit on September 13, 2023.  At that time he exercised for 9 minutes 26 seconds.  An appropriate heart rate mildly exaggerated blood pressure response to exercise and no ischemic EKG changes, symptoms or exercise-induced arrhythmias.  Patient has been well-known to me   for several years initially for what was considered to be whitecoat hypertension and ongoing treatment of his hyperlipidemia with documented atherosclerotic disease based on carotid duplex scanning.  Patient has been maintained on Livalo and an ARB for blood pressure control.  He is taking and tolerating these medications well without issue.  Patient recently had some progression of chronic knee issues and has been less active and has gained 10 to 12 pounds and is quite distressed with most of his weight concentrated to his midsection.  Patient presents today noting that he has been feeling well.  He has some orthopedic knee issues and is limited to his aerobic activity.  He used to be involved in Nat classes currently he is golfing and walking up to 3 miles without any cardiovascular compromise.  He denies chest pain, palpitations, dizziness or lightheadedness, no exertional discomfort.  He is taking and tolerating his current medication which includes valsartan 80 he continues to take without any issues.  Patient notes that his blood pressure was originally whitecoat hypertension but later more consistent elevations and exaggerated blood pressure response exercise prompted initiation of therapy.  He feels well since on therapy and has had appropriate heart rate and blood pressure responses to subsequent stress testing.  Patient presents today otherwise with no new or active cardiac concerns or complaints, taking and tolerating all of his medication without any issue.

## 2024-04-08 NOTE — ED PROVIDER NOTE - CONDUCTED A DETAILED DISCUSSION WITH PATIENT AND/OR GUARDIAN REGARDING, MDM
08-Apr-2024 22:48 lab results/radiology results radiology results/return to ED if symptoms worsen, persist or questions arise/need for outpatient follow-up/lab results

## 2024-04-12 DIAGNOSIS — E66.3 OVERWEIGHT: ICD-10-CM

## 2024-04-16 RX ORDER — TIRZEPATIDE 2.5 MG/.5ML
2.5 INJECTION, SOLUTION SUBCUTANEOUS
Qty: 2 | Refills: 1 | Status: ACTIVE | COMMUNITY
Start: 2024-04-05

## 2024-04-18 ENCOUNTER — APPOINTMENT (OUTPATIENT)
Dept: ORTHOPEDIC SURGERY | Facility: CLINIC | Age: 74
End: 2024-04-18

## 2024-04-18 VITALS — HEIGHT: 69 IN | BODY MASS INDEX: 27.85 KG/M2 | WEIGHT: 188 LBS

## 2024-04-18 NOTE — HISTORY OF PRESENT ILLNESS
[de-identified] : 10/12/23:  Returns today for Euflexxa injection #3, left knee.  Injection #2 given one week ago by Dr. Abarca.  09/25/23:  Returns today complaining of recurrent left knee pain.  Finished Euflexxa injections over 7 months ago. Would like to repeat the injections.  02/16/23:  Returns today for Euflexxa injection #3, left knee.   02/09/23:  Here today for Euflexxa injection #2, left knee.  02/02/23:  Return visit for a 72 year old male here for complaint of recurrent left knee pain and known OA that began over 1 year ago. Taking no nsaids. Worse doing Nat classes or running, or turning and changing directions. Described as sharp fleeting pain. Can walk 7 miles w/o pain.  PMH:  Status post arthroscopic right shoulder surgery two months ago.  IMPRESSION: RIGHT KNEE 04/23/19 1. Popliteus tendinosis. 2. Radial tears of medial and lateral menisci; further detailed above. 3. Arthropathy preferential to medial compartment, in particular outer one-third medial tibial plateau with associated subchondral remodeling. 4. Joint effusion with joint fluid in popliteus tendon sheath and synovitis in popliteus tendon sheath. ___________________________________ 04/18/19: Initial visit for a 68 year old male who states he has been having difficulty exercising for the last year because of developing bilateral knee pain, right worse than left. He has now cut down some on his routine but symptoms are persisting and worsening in the past six days. Used topical cream for pain and Advil once or twice a week for pain. Used kinseology tape which has given him about 90% relief. No prior knee pain or surgery [] : no [FreeTextEntry1] : left knee [FreeTextEntry5] : 73 y/o M here for a 3rd euflexxa injection for his left knee. Finished Euflexxa 7 months ago. Euflexxa relieved pain by 80% for 5-6 months.  [FreeTextEntry6] : soreness [de-identified] : Xray [de-identified] :  [de-identified] : left knee

## 2024-04-18 NOTE — PHYSICAL EXAM
[] : non-antalgic [FreeTextEntry9] : Moderate-severe narrowing medial.  Tiny spurs patella. [TWNoteComboBox7] : flexion 135 degrees

## 2024-04-18 NOTE — DISCUSSION/SUMMARY
[de-identified] : "Written by Alyce Mixon, acting as Scribe for Ike Mar MD."  Dr. Mar -  The documentation recorded by the scribe accurately reflects the service I personally performed and the decisions made by me.

## 2024-04-18 NOTE — PLAN
[TextEntry] : The natural progression of osteoarthritis was explained to the patient.  We discussed the possible treatment options from conservative to operative.  These included NSAIDs, Glucosamine and Chondroitin sulfate, and physical therapy as well different types of injections.  We also discussed that at some point they may progress to need a TKA.  Information and pamphlets were given.  Euflexxa injection #3 given today.

## 2024-06-12 ENCOUNTER — APPOINTMENT (OUTPATIENT)
Dept: CARDIOLOGY | Facility: CLINIC | Age: 74
End: 2024-06-12

## 2024-06-24 RX ORDER — OLMESARTAN MEDOXOMIL 20 MG/1
20 TABLET, FILM COATED ORAL DAILY
Qty: 90 | Refills: 2 | Status: ACTIVE | COMMUNITY
Start: 2024-02-07 | End: 1900-01-01

## 2024-08-08 ENCOUNTER — APPOINTMENT (OUTPATIENT)
Dept: CARDIOLOGY | Facility: CLINIC | Age: 74
End: 2024-08-08

## 2024-08-08 PROCEDURE — 93000 ELECTROCARDIOGRAM COMPLETE: CPT

## 2024-08-08 PROCEDURE — 99214 OFFICE O/P EST MOD 30 MIN: CPT

## 2024-08-08 NOTE — DISCUSSION/SUMMARY
[EKG obtained to assist in diagnosis and management of assessed problem(s)] : EKG obtained to assist in diagnosis and management of assessed problem(s) [FreeTextEntry1] : Patient is a very pleasant 73-year-old white male with hypertension and hyperlipidemia who presents today for routine interval follow-up with no new or active cardiac concerns or complaints.  He continues to gain weight, exercising less because of chronic knee problems but taking and tolerating his medications without issue.  Patient was recommended to initiate a weight loss program, cut his carbohydrates increase his level of exercise and when he returns before departing for [place] for the winter an updated stress test will be performed given his cardiovascular risk factors.  Patient was reassured based on his exam today, no changes were made to his medical regimen return in 4 months for visit and updated stress test.  Joel Goldberg, MD, FACC     \

## 2024-08-08 NOTE — CARDIOLOGY SUMMARY
[de-identified] : (12/13/2023) STRESS TEST CONCLUSIONS: 1. Stress electrocardiogram: No significant ischemic ST segment changes beyond baseline abnormalities. 2. No evidence of exercise induced ischemia by EKG 3. Rare PVC's  ---------------------------------------------------------------------------------------------------------------------------------------------------------  Stress Test Results: METS Achieved: 10 Stage Reached: 4 Exercise Duration: 9 min and 26 sec. Heart Rate: Resting 76 bpm, Stress peak 128 bpm (88 % max predicted) HR Response: Normal. Blood Pressure: Resting 128/60 mmHg, Stress max 154/88 mmHg BP Response: Normal. Exercise Capacity: Good. Pretest Chest Pain: None. Stress Test Chest Pain: No chest pain Symptoms During Stress: No symptoms. Reason for Termination: Target heart rate achieved.   ---------------------------------------------------------------------------------------------------------------------------------------------------------Electrocardiogram: Baseline electrocardiogram: Normal sinus rhythm at a rate of 76 bpm. Stress electrocardiogram: No significant ischemic ST segment changes beyond baseline abnormalities.   Heart rate and blood pressure: The heart rate response was normal. The blood pressure response was normal.  Exercise Capacity: The patient achieved 10 METS, which is consistent with good exercise capacity.  Stress Supervising Provider: Electronically signed by [id number] Damaris Ruiz Interpreting Provider: Electronically signed on 12/13/2023 at 11:20:27 AM by Karan Hutchins

## 2024-08-08 NOTE — HISTORY OF PRESENT ILLNESS
[FreeTextEntry1] : Patient is a 73 year-old white male well-known to me who presents today for routine interval follow-up.  He presents today with continued concerns over his recent weight gain.  He was last here in December 2023 to review a plain stress test recommended at the time of his last visit on September 13, 2023.  At that time he exercised for 9 minutes 26 seconds.  An appropriate heart rate mildly exaggerated blood pressure response to exercise and no ischemic EKG changes, symptoms or exercise-induced arrhythmias.  Patient has been well-known to me   for several years initially for what was considered to be whitecoat hypertension and ongoing treatment of his hyperlipidemia with documented atherosclerotic disease based on carotid duplex scanning.  Patient has been maintained on Livalo and an ARB for blood pressure control.  He is taking and tolerating these medications well without issue.  Patient recently had some progression of chronic knee issues and has been less active and has gained 10 to 12 pounds and is quite distressed with most of his weight concentrated to his midsection.  At the time of her last visit it was recommended he initiate Zepbound and he has been unable to locate it.  He has not been aggressive with dietary restrictions but otherwise with routine activities he feels well and has no active cardiovascular concerns or complaints  Patient presents today noting that he has been feeling well.  He has some orthopedic knee issues and is limited to his aerobic activity.  He used to be involved in [employer] classes currently he is golfing and walking up to 3 miles without any cardiovascular compromise.  He denies chest pain, palpitations, dizziness or lightheadedness, no exertional discomfort.  He is taking and tolerating his current medication which includes valsartan 80 he continues to take without any issues.  Patient notes that his blood pressure was originally whitecoat hypertension but later more consistent elevations and exaggerated blood pressure response exercise prompted initiation of therapy.  He feels well since on therapy and has had appropriate heart rate and blood pressure responses to subsequent stress testing.  Patient presents today otherwise with no new or active cardiac concerns or complaints, taking and tolerating all of his medication without any issue.

## 2024-08-08 NOTE — REASON FOR VISIT
[CV Risk Factors and Non-Cardiac Disease] : CV risk factors and non-cardiac disease [Hyperlipidemia] : hyperlipidemia [Hypertension] : hypertension [Other: ____] : [unfilled] [FreeTextEntry1] : Patient is a 73-year-old white male who presents the office today for routine interval follow-up regarding concerns over his hypertension, hyperlipidemia and now quite distressed over recent weight gain resulting from drop in exercise related to chronic knee issues.  Patient was recommended to initiate Zepbound at the time of his last visit but was unable to obtain the drug.  His weight has gone up to 190 but otherwise he has no new or active cardiovascular concerns or complaints, is exercising less but has no symptomatology.

## 2024-08-20 ENCOUNTER — APPOINTMENT (OUTPATIENT)
Dept: ORTHOPEDIC SURGERY | Facility: CLINIC | Age: 74
End: 2024-08-20
Payer: MEDICARE

## 2024-08-20 VITALS — HEIGHT: 69 IN | BODY MASS INDEX: 28.44 KG/M2 | WEIGHT: 192 LBS

## 2024-08-20 DIAGNOSIS — I10 ESSENTIAL (PRIMARY) HYPERTENSION: ICD-10-CM

## 2024-08-20 PROCEDURE — 99213 OFFICE O/P EST LOW 20 MIN: CPT | Mod: 25

## 2024-08-20 PROCEDURE — 20610 DRAIN/INJ JOINT/BURSA W/O US: CPT | Mod: LT

## 2024-08-20 NOTE — PLAN
[TextEntry] : The natural progression of osteoarthritis was explained to the patient.  We discussed the possible treatment options from conservative to operative.  These included NSAIDs, Glucosamine and Chondroitin sulfate, and physical therapy as well different types of injections.  We also discussed that at some point they may progress to need a TKA.  Information and pamphlets were given.  Would like to start Euflexxa injections #1 lt knee today.

## 2024-08-20 NOTE — PROCEDURE
[Large Joint Injection] : Large joint injection [Left] : of the left [Knee] : knee [Pain] : pain [X-ray evidence of Osteoarthritis on this or prior visit] : x-ray evidence of Osteoarthritis on this or prior visit [Euflexxa(20mg)] : 20mg of Euflexxa [#1] : series #1 [] : Patient tolerated procedure well [Call if redness, pain or fever occur] : call if redness, pain or fever occur [Apply ice for 15min out of every hour for the next 12-24 hours as tolerated] : apply ice for 15 minutes out of every hour for the next 12-24 hours as tolerated [Risks, benefits, alternatives discussed / Verbal consent obtained] : the risks benefits, and alternatives have been discussed, and verbal consent was obtained

## 2024-08-20 NOTE — HISTORY OF PRESENT ILLNESS
[Lower back] : lower back [Sudden] : sudden [Full time] : Work status: full time [Euflexxa] : Euflexxa [8] : 8 [2] : 2 [Sharp] : sharp [Constant] : constant [Ice] : ice [de-identified] : 08/20/24:  Patient returns today c/o recurring lt knee pain after playing golf daily x last 4 days duration. Has a slight limp.  03/25/24:  Returns today c/o acute onset of LBP after bending and lifting packages x 1 month ago. Took some advil prn and started PT2x/week which helps a little. Worse turning in bed. No wake up pain . LBP is a "2-3". Localized to LB only.  PMH: Hx of occ LBP in the past  10/12/23:  Returns today for Euflexxa injection #3, left knee.  Injection #2 given one week ago by Dr. Abarca.  09/25/23:  Returns today complaining of recurrent left knee pain.  Finished Euflexxa injections over 7 months ago. Would like to repeat the injections.  02/16/23:  Returns today for Euflexxa injection #3, left knee.   02/09/23:  Here today for Euflexxa injection #2, left knee.  02/02/23:  Return visit for a 72 year old male here for complaint of recurrent left knee pain and known OA that began over 1 year ago. Taking no nsaids. Worse doing Nat classes or running, or turning and changing directions. Described as sharp fleeting pain. Can walk 7 miles w/o pain.  PMH:  Status post arthroscopic right shoulder surgery two months ago.  IMPRESSION: RIGHT KNEE 04/23/19 1. Popliteus tendinosis. 2. Radial tears of medial and lateral menisci; further detailed above. 3. Arthropathy preferential to medial compartment, in particular outer one-third medial tibial plateau with associated subchondral remodeling. 4. Joint effusion with joint fluid in popliteus tendon sheath and synovitis in popliteus tendon sheath. ___________________________________ 04/18/19: Initial visit for a 68 year old male who states he has been having difficulty exercising for the last year because of developing bilateral knee pain, right worse than left. He has now cut down some on his routine but symptoms are persisting and worsening in the past six days. Used topical cream for pain and Advil once or twice a week for pain. Used kinseology tape which has given him about 90% relief. 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

## 2024-08-20 NOTE — PHYSICAL EXAM
[Normal Mood and Affect] : normal mood and affect [Able to Communicate] : able to communicate [Well Developed] : well developed [Well Nourished] : well nourished [Right] : right knee [NL (140)] : flexion 140 degrees [NL (0)] : extension 0 degrees [5___] : hamstring 5[unfilled]/5 [Left] : left knee [AP] : anteroposterior [Lateral] : lateral [[place]] : skyline [Degenerative change] : Degenerative change [] : non-antalgic [FreeTextEntry9] : Moderate-severe narrowing medial.  Tiny spurs patella. [TWNoteComboBox7] : flexion 135 degrees

## 2024-08-26 NOTE — PROCEDURE
[Large Joint Injection] : Large joint injection [Left] : of the left [Knee] : knee [Pain] : pain [X-ray evidence of Osteoarthritis on this or prior visit] : x-ray evidence of Osteoarthritis on this or prior visit [Euflexxa(20mg)] : 20mg of Euflexxa [#2] : series #2 [] : Patient tolerated procedure well [Call if redness, pain or fever occur] : call if redness, pain or fever occur [Apply ice for 15min out of every hour for the next 12-24 hours as tolerated] : apply ice for 15 minutes out of every hour for the next 12-24 hours as tolerated [Risks, benefits, alternatives discussed / Verbal consent obtained] : the risks benefits, and alternatives have been discussed, and verbal consent was obtained

## 2024-08-27 ENCOUNTER — APPOINTMENT (OUTPATIENT)
Dept: ORTHOPEDIC SURGERY | Facility: CLINIC | Age: 74
End: 2024-08-27
Payer: MEDICARE

## 2024-08-27 VITALS — BODY MASS INDEX: 28.44 KG/M2 | HEIGHT: 69 IN | WEIGHT: 192 LBS

## 2024-08-27 DIAGNOSIS — M17.12 UNILATERAL PRIMARY OSTEOARTHRITIS, LEFT KNEE: ICD-10-CM

## 2024-08-27 PROCEDURE — 20610 DRAIN/INJ JOINT/BURSA W/O US: CPT | Mod: RT

## 2024-08-27 PROCEDURE — 99213 OFFICE O/P EST LOW 20 MIN: CPT | Mod: 25

## 2024-08-27 NOTE — DISCUSSION/SUMMARY
[de-identified] : "Written by Alyce Mixon, acting as Scribe for Ike Mar MD."  Dr. Mar -  The documentation recorded by the scribe accurately reflects the service I personally performed and the decisions made by me.

## 2024-08-27 NOTE — DISCUSSION/SUMMARY
[de-identified] : "Written by Alyce Mixon, acting as Scribe for Ike Mar MD."  Dr. Mar -  The documentation recorded by the scribe accurately reflects the service I personally performed and the decisions made by me.

## 2024-08-27 NOTE — HISTORY OF PRESENT ILLNESS
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

## 2024-08-27 NOTE — HISTORY OF PRESENT ILLNESS
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

## 2024-08-27 NOTE — PLAN
[TextEntry] : The natural progression of osteoarthritis was explained to the patient.  We discussed the possible treatment options from conservative to operative.  These included NSAIDs, Glucosamine and Chondroitin sulfate, and physical therapy as well different types of injections.  We also discussed that at some point they may progress to need a TKA.  Information and pamphlets were given.  Euflexxa injections #2 left knee given today.

## 2024-08-27 NOTE — PHYSICAL EXAM
[Normal Mood and Affect] : normal mood and affect [Able to Communicate] : able to communicate [Well Developed] : well developed [Well Nourished] : well nourished [Right] : right knee [NL (140)] : flexion 140 degrees [NL (0)] : extension 0 degrees [5___] : hamstring 5[unfilled]/5 [Left] : left knee [AP] : anteroposterior [Lateral] : lateral [[place]] : skyline [Degenerative change] : Degenerative change [] : non-antalgic [FreeTextEntry9] : Moderate-severe narrowing medial.  Tiny spurs patella. [TWNoteComboBox7] : flexion 135 degrees

## 2024-08-27 NOTE — PHYSICAL EXAM
[Normal Mood and Affect] : normal mood and affect [Able to Communicate] : able to communicate [Well Developed] : well developed [Well Nourished] : well nourished [Right] : right knee [NL (140)] : flexion 140 degrees [NL (0)] : extension 0 degrees [5___] : hamstring 5[unfilled]/5 [Left] : left knee [AP] : anteroposterior [Lateral] : lateral [[place]] : skyline [Degenerative change] : Degenerative change [] : non-antalgic [FreeTextEntry9] : Moderate-severe narrowing medial.  Tiny spurs patella. [TWNoteComboBox7] : flexion 135 degrees

## 2024-09-05 ENCOUNTER — APPOINTMENT (OUTPATIENT)
Dept: ORTHOPEDIC SURGERY | Facility: CLINIC | Age: 74
End: 2024-09-05
Payer: MEDICARE

## 2024-09-05 DIAGNOSIS — M17.12 UNILATERAL PRIMARY OSTEOARTHRITIS, LEFT KNEE: ICD-10-CM

## 2024-09-05 PROCEDURE — 20610 DRAIN/INJ JOINT/BURSA W/O US: CPT | Mod: LT

## 2024-09-05 PROCEDURE — 99213 OFFICE O/P EST LOW 20 MIN: CPT | Mod: 25

## 2024-09-05 NOTE — HISTORY OF PRESENT ILLNESS
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

## 2024-09-05 NOTE — PHYSICAL EXAM
[Normal Mood and Affect] : normal mood and affect [Able to Communicate] : able to communicate [Well Developed] : well developed [Well Nourished] : well nourished [Right] : right knee [NL (140)] : flexion 140 degrees [NL (0)] : extension 0 degrees [5___] : hamstring 5[unfilled]/5 [Left] : left knee [AP] : anteroposterior [Lateral] : lateral [[place]] : skyline [Degenerative change] : Degenerative change [] : non-antalgic [FreeTextEntry9] : Moderate-severe narrowing medial.  Tiny spurs patella. [TWNoteComboBox7] : flexion 135 degrees

## 2024-09-05 NOTE — PROCEDURE
[Large Joint Injection] : Large joint injection [Left] : of the left [Knee] : knee [Pain] : pain [X-ray evidence of Osteoarthritis on this or prior visit] : x-ray evidence of Osteoarthritis on this or prior visit [Euflexxa(20mg)] : 20mg of Euflexxa [] : Patient tolerated procedure well [Call if redness, pain or fever occur] : call if redness, pain or fever occur [Apply ice for 15min out of every hour for the next 12-24 hours as tolerated] : apply ice for 15 minutes out of every hour for the next 12-24 hours as tolerated [Risks, benefits, alternatives discussed / Verbal consent obtained] : the risks benefits, and alternatives have been discussed, and verbal consent was obtained [#3] : series #3

## 2024-09-05 NOTE — DISCUSSION/SUMMARY
[de-identified] : "Written by Alyce Mixon, acting as Scribe for Ike Mar MD."  Dr. Mar -  The documentation recorded by the scribe accurately reflects the service I personally performed and the decisions made by me.

## 2024-09-05 NOTE — PLAN
[TextEntry] : The natural progression of osteoarthritis was explained to the patient.  We discussed the possible treatment options from conservative to operative.  These included NSAIDs, Glucosamine and Chondroitin sulfate, and physical therapy as well different types of injections.  We also discussed that at some point they may progress to need a TKA.  Information and pamphlets were given.  Euflexxa injections #3 left knee given today.  The patient was instructed on the importance of ice and elevation of the extremity to decrease swelling and pain.

## 2024-12-06 ENCOUNTER — APPOINTMENT (OUTPATIENT)
Dept: CARDIOLOGY | Facility: CLINIC | Age: 74
End: 2024-12-06

## 2025-03-26 ENCOUNTER — APPOINTMENT (OUTPATIENT)
Dept: CARDIOLOGY | Facility: CLINIC | Age: 75
End: 2025-03-26

## 2025-05-02 ENCOUNTER — NON-APPOINTMENT (OUTPATIENT)
Age: 75
End: 2025-05-02

## 2025-05-02 ENCOUNTER — APPOINTMENT (OUTPATIENT)
Dept: CARDIOLOGY | Facility: CLINIC | Age: 75
End: 2025-05-02
Payer: MEDICARE

## 2025-05-02 VITALS
HEIGHT: 69 IN | HEART RATE: 74 BPM | OXYGEN SATURATION: 96 % | SYSTOLIC BLOOD PRESSURE: 128 MMHG | DIASTOLIC BLOOD PRESSURE: 64 MMHG | BODY MASS INDEX: 28.73 KG/M2 | WEIGHT: 194 LBS

## 2025-05-02 DIAGNOSIS — I10 ESSENTIAL (PRIMARY) HYPERTENSION: ICD-10-CM

## 2025-05-02 DIAGNOSIS — E66.3 OVERWEIGHT: ICD-10-CM

## 2025-05-02 DIAGNOSIS — E78.49 OTHER HYPERLIPIDEMIA: ICD-10-CM

## 2025-05-02 PROCEDURE — 93000 ELECTROCARDIOGRAM COMPLETE: CPT

## 2025-05-02 PROCEDURE — 99214 OFFICE O/P EST MOD 30 MIN: CPT

## 2025-05-02 RX ORDER — TIRZEPATIDE 2.5 MG/.5ML
2.5 INJECTION, SOLUTION SUBCUTANEOUS
Qty: 1 | Refills: 1 | Status: ACTIVE | COMMUNITY
Start: 2025-05-02 | End: 1900-01-01

## 2025-09-09 ENCOUNTER — RX RENEWAL (OUTPATIENT)
Age: 75
End: 2025-09-09

## 2025-09-16 ENCOUNTER — RX RENEWAL (OUTPATIENT)
Age: 75
End: 2025-09-16